# Patient Record
Sex: FEMALE | Race: AMERICAN INDIAN OR ALASKA NATIVE
[De-identification: names, ages, dates, MRNs, and addresses within clinical notes are randomized per-mention and may not be internally consistent; named-entity substitution may affect disease eponyms.]

---

## 2018-06-20 ENCOUNTER — HOSPITAL ENCOUNTER (OUTPATIENT)
Dept: HOSPITAL 5 - CT | Age: 53
Discharge: HOME | End: 2018-06-20
Attending: INTERNAL MEDICINE
Payer: COMMERCIAL

## 2018-06-20 DIAGNOSIS — K86.2: Primary | ICD-10-CM

## 2018-06-20 PROCEDURE — 74170 CT ABD WO CNTRST FLWD CNTRST: CPT

## 2018-06-20 NOTE — CAT SCAN REPORT
CT scan of abdomen  without and with IV contrast: Compared to 12/22/15.



History: Pancreatic cyst.



Findings:



Normal liver spleen and gallbladder. 2 small hypodensities, probably 

cysts are identified in the junction of the head and body of the 

pancreas measuring approximately 4 mm in diameter each. Appear to be 

either same size or slightly smaller compared to previous study. No 

additional hypodensities are identified. Peripancreatic tissue appears 

normal.



Normal adrenals and kidneys.

Gaseous colon with large volume stool in colon. No bowel distention.



Impression:



2 small hypodensities in the pancreas at the junction of the head and 

the body. These appear to be slightly smaller almost the same size 

compared to the previous study. No additional hypodensities are noted.

## 2020-07-01 ENCOUNTER — OFFICE VISIT (OUTPATIENT)
Dept: URBAN - METROPOLITAN AREA CLINIC 82 | Facility: CLINIC | Age: 55
End: 2020-07-01